# Patient Record
Sex: FEMALE | Race: WHITE | Employment: OTHER | URBAN - METROPOLITAN AREA
[De-identification: names, ages, dates, MRNs, and addresses within clinical notes are randomized per-mention and may not be internally consistent; named-entity substitution may affect disease eponyms.]

---

## 2017-11-15 ENCOUNTER — OFFICE VISIT (OUTPATIENT)
Dept: SURGERY | Age: 72
End: 2017-11-15

## 2017-11-15 ENCOUNTER — ANESTHESIA EVENT (OUTPATIENT)
Dept: SURGERY | Age: 72
End: 2017-11-15
Payer: MEDICARE

## 2017-11-15 VITALS
HEART RATE: 60 BPM | DIASTOLIC BLOOD PRESSURE: 70 MMHG | OXYGEN SATURATION: 97 % | RESPIRATION RATE: 16 BRPM | TEMPERATURE: 96.5 F | BODY MASS INDEX: 19.76 KG/M2 | SYSTOLIC BLOOD PRESSURE: 161 MMHG | WEIGHT: 118.6 LBS | HEIGHT: 65 IN

## 2017-11-15 DIAGNOSIS — K41.90 FEMORAL HERNIA OF LEFT SIDE: Primary | ICD-10-CM

## 2017-11-15 RX ORDER — ATORVASTATIN CALCIUM 40 MG/1
40 TABLET, FILM COATED ORAL
COMMUNITY

## 2017-11-15 RX ORDER — EZETIMIBE 10 MG/1
10 TABLET ORAL
COMMUNITY

## 2017-11-15 RX ORDER — IBUPROFEN 200 MG
200 TABLET ORAL
COMMUNITY
End: 2017-11-15

## 2017-11-15 RX ORDER — SOTALOL HYDROCHLORIDE 80 MG/1
TABLET ORAL
COMMUNITY

## 2017-11-15 RX ORDER — LOSARTAN POTASSIUM 25 MG/1
25 TABLET ORAL
COMMUNITY

## 2017-11-15 RX ORDER — ASPIRIN 81 MG/1
81 TABLET ORAL
COMMUNITY

## 2017-11-15 NOTE — LETTER
11/15/2017 10:02 AM 
 
Patient:  Saúl Angulo YOB: 1945 Date of Visit: 11/15/2017 MD [address] [facility] [address] [facility] [address] VIA Facsimile: [phone] Dear Sandy Rivera MD, Thank you for referring Ms. Luisa Romeo to [place] [address] for evaluation and treatment. Below are the relevant portions of my assessment and plan of care. Thank you very much for your referral of Ms. Cori Flores. If you have questions, please do not hesitate to call me. I look forward to following Ms. Romeo along with you and will keep you updated as to her progress. Sincerely, Josué Okeefe MD

## 2017-11-15 NOTE — PATIENT INSTRUCTIONS
If you have any questions or concerns about today's appointment, the verbal and/or written instructions you were given for follow up care, please call our office at [phone]. [facility] - [facility]  [id number] [place] [address] Road    [phone] office  [phone] fax    . Lois Bustos PATIENT PRE AND POST OPERATIVE INSTRUCTIONS     [facility] [address]  [phone]    Before Surgery Instructions:   1) You must have someone available to drive you to and from your procedure and stay with you for the first 24 hours. 2) It is very important that you have nothing to eat or drink after midnight the night before your surgery. This includes chewing gum or sucking on hard candy. Take only heart, blood pressure and cholesterol medications the morning of surgery with only a sip of water. 3) Please stop taking Plavix 10 days prior to your surgery. Stop taking Coumadin 5 days prior to your surgery. Stop taking all Aspirin or Aspirin containing products 7 days prior to your surgery. Stop taking Advil, Motrin, Aleve, and etc. 3 days prior to your surgery. 4) If you take any diabetic medications please consult with your primary care physician on how to take them on the day of your surgery  5) Please stop all Herbal products 2 weeks prior to your surgery. 6) Please arrive at the hospital 2 hours prior to your surgery, unless you have been otherwise instructed. 7) Patients having an operation on their colon will be given a separate instruction sheet on their Bowel Prep. 8) For any pre-operative work up check in at the main entrance to [facility], and then go to Patient Registration. These studies are done on a walk in basis they are open from 7:00am to 5:00pm Monday through Friday. 9) Please wash your surgical site the morning of your surgery with soap and water.   10) If you are of child bearing age you will have pregnancy test done the morning of your surgery as soon as you arrive. 11) You may be contacted to change your surgery time. At times this is necessary due to equipment or staffing needs. After Surgery Instructions: You will need to be seen in the office for a follow-up visit 7-14 days after your surgery. Please call after you have had the procedure to make this appointment. Unless otherwise instructed, you may remove your outer bandage and shower 48 hours after your surgery. If you develop a fever greater than 101, have any significant drainage, bleeding, swelling and/or pus of the wound. Please call our office immediately. Surgery Date and Time:  Thursday, November 16, 2017 at 11:00am     Please check in at [facility], enter through the Emergency Room entrance and go up to the second floor. Please check in by 9:00am the day of your surgery. You may contact Eric Cedeno with any questions at [phone].

## 2017-11-15 NOTE — PROGRESS NOTES
General Surgery Consult      Shanthi Goodman  Admit date: (Not on file)    MRN: [id number]     : 1945     Age: 67 y.o. Attending Physician: Jean Phoenix, MD, FACS      History of Present Illness:     Shanthi Goodman is a 67 y.o. female was referred for evaluation of right inguinal hernia. Symptoms were first noted a few months ago. Pain is dull, intermittent. The mass is reducible. The mass does come and goes depending If she is standing or sittingShe does not have a history of incarceration or obstruction. Patient does not have chronic constipation. Patient does not have a history of  previous hernia surgery. There are no active problems to display for this patient. No past medical history on file. No past surgical history on file. Social History   Substance Use Topics    Smoking status: Former Smoker    Smokeless tobacco: Never Used    Alcohol use Not on file      History   Smoking Status    Former Smoker   Smokeless Tobacco    Never Used     No family history on file. Current Outpatient Prescriptions   Medication Sig    aspirin delayed-release 81 mg tablet 81 mg.    atorvastatin (LIPITOR) 40 mg tablet 40 mg.    ezetimibe (ZETIA) 10 mg tablet 10 mg.    ibuprofen (MOTRIN) 200 mg tablet 200 mg.    losartan (COZAAR) 25 mg tablet 25 mg.    sotalol (BETAPACE) 80 mg tablet Take  by Mouth.  apixaban (ELIQUIS) 5 mg tablet Take 5 mg by mouth two (2) times a day.  apixaban (ELIQUIS) 2.5 mg tablet Take  by Mouth. No current facility-administered medications for this visit.        Allergies   Allergen Reactions    Sulfa (Sulfonamide Antibiotics) Rash        Review of Systems:  Constitutional: negative  Eyes: negative  Ears, Nose, Mouth, Throat, and Face: negative  Respiratory: negative  Cardiovascular: negative  Gastrointestinal: positive for abdominal pain  Genitourinary:negative  Integument/Breast: negative  Hematologic/Lymphatic: negative  Musculoskeletal:negative  Neurological: negative  Behavioral/Psychiatric: negative  Endocrine: negative  Allergic/Immunologic: negative    Objective:     Visit Vitals    /70 (BP 1 Location: Left arm, BP Patient Position: Sitting)    Pulse 60    Temp 96.5 °F (35.8 °C) (Oral)    Resp 16    Ht 5' 5\" (1.651 m)    Wt 53.8 kg (118 lb 9.6 oz)    SpO2 97%    BMI 19.74 kg/m2       Physical Exam:      General:  in no apparent distress, alert, oriented times 3 and afebrile   Eyes:  conjunctivae and sclerae normal, pupils equal, round, reactive to light   Throat & Neck: no erythema or exudates noted and neck supple and symmetrical; no palpable masses   Lungs:   clear to auscultation bilaterally   Heart:  Regular rate and rhythm   Abdomen:   flat, soft, nontender, nondistended, no masses or organomegaly. There is a small right femoral hernia that is mildly tender but reducible. Extremities: extremities normal, atraumatic, no cyanosis or edema   Skin: Normal.       Imaging and Lab Review:     CBC: No results found for: WBC, RBC, HGB, HCT, PLT, HGBEXT, HCTEXT, PLTEXT  BMP: No results found for: GLU, NA, K, CL, CO2, BUN, CREA, CA  CMP:No results found for: GLU, NA, K, CL, CO2, BUN, CREA, CA, AGAP, BUCR, TBIL, GPT, AP, TP, ALB, GLOB, AGRAT    No results found for this or any previous visit (from the past 24 hour(s)). images and reports reviewed    Assessment:   Sofi Killian is a 67 y.o. female is presenting with a picture of last femoral hernia. I Discussed the possibility of incarceration, strangulation, enlargement in size over time, and the risk of emergency surgery in the face of strangulation. I also discussed the use of prosthetic materials (mesh), including the risk of infection.  Also discussed the risk of surgery including recurrence and the possible need for reoperation and removal of mesh if used, possibility of postoperative small bowel injury, obstruction or ileus, and the risks of general anesthetic. I explained to the the patient about the robotic hernia repair procedure. Plan:     1. Schedule for robotic left femoral hernia repair with placement of mesh. 2. No heavy lifting for 2 weeks after the surgery (More than 15 pounds)  3. Avoid constipation by taking stool softener.      Please call me if you have any questions (cell phone: [phone])     Signed By: Susan Kwok MD     November 15, 2017

## 2017-11-15 NOTE — MR AVS SNAPSHOT
Visit Information Date & Time Provider Department Dept. Phone Encounter #  
 11/15/2017 10:00 AM Jaswinder Harrison [address] [facility] [phone] [id number] Your Appointments 12/4/2017 10:00 AM  
POST OP with Gavin Rapp MD  
[address] [place] [address] [facility]) Appt Note: Post Op [facility] [address]  
  
   
 [facility] [address] [facility] 95 Upcoming Health Maintenance Date Due DTaP/Tdap/Td series (1 - Tdap) 5/19/1965 BREAST CANCER SCRN MAMMOGRAM 5/19/1994 FOBT Q 1 YEAR AGE 50-75 5/19/1994 ZOSTER VACCINE AGE 60> 3/19/2004 GLAUCOMA SCREENING Q2Y 5/19/2009 Pneumococcal 65+ Low/Medium Risk (1 of 2 - PCV13) 5/19/2009 MEDICARE YEARLY EXAM 5/19/2009 Influenza Age 5 to Adult 8/1/2017 Allergies as of 11/15/2017  Review Complete On: 11/15/2017 By: Robby Robles Severity Noted Reaction Type Reactions Sulfa (Sulfonamide Antibiotics)  11/15/2017    Rash Current Immunizations  Never Reviewed No immunizations on file. Not reviewed this visit Vitals BP Pulse Temp Resp Height(growth percentile) Weight(growth percentile) 161/70 (BP 1 Location: Left arm, BP Patient Position: Sitting) 60 96.5 °F (35.8 °C) (Oral) 16 5' 5\" (1.651 m) 118 lb 9.6 oz (53.8 kg) SpO2 BMI OB Status Smoking Status 97% 19.74 kg/m2 Menopause Former Smoker BMI and BSA Data Body Mass Index Body Surface Area 19.74 kg/m 2 1.57 m 2 Your Updated Medication List  
  
   
This list is accurate as of: 11/15/17 10:27 AM.  Always use your most recent med list.  
  
  
  
  
 aspirin delayed-release 81 mg tablet 81 mg.  
  
 atorvastatin 40 mg tablet Commonly known as:  LIPITOR 40 mg.  
  
 * ELIQUIS 2.5 mg tablet Generic drug:  apixaban Take  by Mouth. * ELIQUIS 5 mg tablet Generic drug:  apixaban Take 5 mg by mouth two (2) times a day.  
  
 ezetimibe 10 mg tablet Commonly known as:  Zelpha [place] 10 mg.  
  
 ibuprofen 200 mg tablet Commonly known as:  MOTRIN  
200 mg.  
  
 losartan 25 mg tablet Commonly known as:  COZAAR  
25 mg.  
  
 sotalol 80 mg tablet Commonly known as:  Schaffer Minda Take  by Mouth. * Notice: This list has 2 medication(s) that are the same as other medications prescribed for you. Read the directions carefully, and ask your doctor or other care provider to review them with you. Patient Instructions If you have any questions or concerns about today's appointment, the verbal and/or written instructions you were given for follow up care, please call our office at [phone]. [facility] - [address], [address] 
 
[phone] office [phone] fax Dalton Sandoval PATIENT PRE AND POST OPERATIVE INSTRUCTIONS [address] 
[phone] Before Surgery Instructions:  
1) You must have someone available to drive you to and from your procedure and stay with you for the first 24 hours. 2) It is very important that you have nothing to eat or drink after midnight the night before your surgery. This includes chewing gum or sucking on hard candy. Take only heart, blood pressure and cholesterol medications the morning of surgery with only a sip of water. 3) Please stop taking Plavix 10 days prior to your surgery. Stop taking Coumadin 5 days prior to your surgery. Stop taking all Aspirin or Aspirin containing products 7 days prior to your surgery. Stop taking Advil, Motrin, Aleve, and etc. 3 days prior to your surgery. 4) If you take any diabetic medications please consult with your primary care physician on how to take them on the day of your surgery 5) Please stop all Herbal products 2 weeks prior to your surgery. 6) Please arrive at the hospital 2 hours prior to your surgery, unless you have been otherwise instructed. 7) Patients having an operation on their colon will be given a separate instruction sheet on their Bowel Prep. 8) For any pre-operative work up check in at the main entrance to [address] [place] [address], and then go to Patient Registration. These studies are done on a walk in basis they are open from 7:00am to 5:00pm Monday through Friday. 9) Please wash your surgical site the morning of your surgery with soap and water. 10) If you are of child bearing age you will have pregnancy test done the morning of your surgery as soon as you arrive. 11) You may be contacted to change your surgery time. At times this is necessary due to equipment or staffing needs. After Surgery Instructions: You will need to be seen in the office for a follow-up visit 7-14 days after your surgery. Please call after you have had the procedure to make this appointment. Unless otherwise instructed, you may remove your outer bandage and shower 48 hours after your surgery. If you develop a fever greater than 101, have any significant drainage, bleeding, swelling and/or pus of the wound. Please call our office immediately. Surgery Date and Time:  Thursday, November 16, 2017 at 11:00am  
 
Please check in at [facility], enter through the Emergency Room entrance and go up to the second floor. Please check in by 9:00am the day of your surgery. You may contact Karma Salomon with any questions at [phone]. Introducing [facility] HEALTH [facility]! Corbin Fitzpatrick introduces [employer] patient portal. Now you can access parts of your medical record, email your doctor's office, and request medication refills online. 1. In your internet browser, go to [email or web address] 2. Click on the First Time User? Click Here link in the Sign In box. You will see the New Member Sign Up page. 3. Enter your [employer] Access Code exactly as it appears below. You will not need to use this code after youve completed the sign-up process. If you do not sign up before the expiration date, you must request a new code. · [employer] Access Code: [email or web address] Expires: 2/13/2018  9:45 AM 
 
4. Enter the last four digits of your Social Security Number (xxxx) and Date of Birth (mm/dd/yyyy) as indicated and click Submit. You will be taken to the next sign-up page. 5. Create a [employer] ID. This will be your [employer] login ID and cannot be changed, so think of one that is secure and easy to remember. 6. Create a [employer] password. You can change your password at any time. 7. Enter your Password Reset Question and Answer. This can be used at a later time if you forget your password. 8. Enter your e-mail address. You will receive e-mail notification when new information is available in [address]. 9. Click Sign Up. You can now view and download portions of your medical record. 10. Click the Download Summary menu link to download a portable copy of your medical information. If you have questions, please visit the Frequently Asked Questions section of the [employer] website. Remember, [employer] is NOT to be used for urgent needs. For medical emergencies, dial 911. Now available from your iPhone and Android! Please provide this summary of care documentation to your next provider. Your primary care clinician is listed as Thee Jin. If you have any questions after today's visit, please call [phone].

## 2017-11-16 ENCOUNTER — ANESTHESIA (OUTPATIENT)
Dept: SURGERY | Age: 72
End: 2017-11-16
Payer: MEDICARE

## 2017-11-16 ENCOUNTER — HOSPITAL ENCOUNTER (OUTPATIENT)
Age: 72
Setting detail: OUTPATIENT SURGERY
Discharge: HOME | End: 2017-11-16
Attending: SURGERY | Admitting: SURGERY
Payer: MEDICARE

## 2017-11-16 VITALS
HEIGHT: 65 IN | WEIGHT: 117.3 LBS | DIASTOLIC BLOOD PRESSURE: 69 MMHG | TEMPERATURE: 97.4 F | SYSTOLIC BLOOD PRESSURE: 158 MMHG | RESPIRATION RATE: 18 BRPM | OXYGEN SATURATION: 100 % | BODY MASS INDEX: 19.54 KG/M2 | HEART RATE: 51 BPM

## 2017-11-16 LAB
BUN BLD-MCNC: 13 MG/DL (ref 7–18)
CHLORIDE BLD-SCNC: 100 MMOL/L (ref 100–108)
GLUCOSE BLD STRIP.AUTO-MCNC: 96 MG/DL (ref 74–106)
HCT VFR BLD CALC: 44 % (ref 36–49)
HGB BLD-MCNC: 15 G/DL (ref 12–16)
POTASSIUM BLD-SCNC: 3.9 MMOL/L (ref 3.5–5.5)
SODIUM BLD-SCNC: 140 MMOL/L (ref 136–145)

## 2017-11-16 PROCEDURE — 77030031139 HC SUT VCRL2 J&J -A: Performed by: SURGERY

## 2017-11-16 PROCEDURE — 74011250637 HC RX REV CODE- 250/637: Performed by: NURSE ANESTHETIST, CERTIFIED REGISTERED

## 2017-11-16 PROCEDURE — 77030032490 HC SLV COMPR SCD KNE COVD -B: Performed by: SURGERY

## 2017-11-16 PROCEDURE — 74011000250 HC RX REV CODE- 250

## 2017-11-16 PROCEDURE — 82947 ASSAY GLUCOSE BLOOD QUANT: CPT

## 2017-11-16 PROCEDURE — 74011250636 HC RX REV CODE- 250/636

## 2017-11-16 PROCEDURE — 77030002933 HC SUT MCRYL J&J -A: Performed by: SURGERY

## 2017-11-16 PROCEDURE — 77030013079 HC BLNKT BAIR HGGR 3M -A: Performed by: NURSE ANESTHETIST, CERTIFIED REGISTERED

## 2017-11-16 PROCEDURE — 93005 ELECTROCARDIOGRAM TRACING: CPT

## 2017-11-16 PROCEDURE — 77030008477 HC STYL SATN SLP COVD -A: Performed by: NURSE ANESTHETIST, CERTIFIED REGISTERED

## 2017-11-16 PROCEDURE — 77030020703 HC SEAL CANN DISP INTU -B: Performed by: SURGERY

## 2017-11-16 PROCEDURE — 74011250636 HC RX REV CODE- 250/636: Performed by: SURGERY

## 2017-11-16 PROCEDURE — 77030022704 HC SUT VLOC COVD -B: Performed by: SURGERY

## 2017-11-16 PROCEDURE — 76210000026 HC REC RM PH II 1 TO 1.5 HR: Performed by: SURGERY

## 2017-11-16 PROCEDURE — 77030011640 HC PAD GRND REM COVD -A: Performed by: SURGERY

## 2017-11-16 PROCEDURE — 76210000016 HC OR PH I REC 1 TO 1.5 HR: Performed by: SURGERY

## 2017-11-16 PROCEDURE — 76060000033 HC ANESTHESIA 1 TO 1.5 HR: Performed by: SURGERY

## 2017-11-16 PROCEDURE — 74011250636 HC RX REV CODE- 250/636: Performed by: NURSE ANESTHETIST, CERTIFIED REGISTERED

## 2017-11-16 PROCEDURE — 74011000250 HC RX REV CODE- 250: Performed by: SURGERY

## 2017-11-16 PROCEDURE — 77030008683 HC TU ET CUF COVD -A: Performed by: NURSE ANESTHETIST, CERTIFIED REGISTERED

## 2017-11-16 PROCEDURE — 77030010507 HC ADH SKN DERMBND J&J -B: Performed by: SURGERY

## 2017-11-16 PROCEDURE — 76010000934 HC OR TIME 1 TO 1.5HR INTENSV - TIER 2: Performed by: SURGERY

## 2017-11-16 PROCEDURE — 77030018842 HC SOL IRR SOD CL 9% BAXT -A: Performed by: SURGERY

## 2017-11-16 PROCEDURE — C1781 MESH (IMPLANTABLE): HCPCS | Performed by: SURGERY

## 2017-11-16 PROCEDURE — 77030035277 HC OBTRTR BLDELSS DISP INTU -B: Performed by: SURGERY

## 2017-11-16 DEVICE — LAPAROSCOPIC SELF-FIXATING MESH POLYESTER WITH POLYLACTIC ACID GRIPS AND COLLAGEN FILM
Type: IMPLANTABLE DEVICE | Site: INGUINAL | Status: FUNCTIONAL
Brand: PROGRIP

## 2017-11-16 RX ORDER — GLYCOPYRROLATE 0.2 MG/ML
INJECTION INTRAMUSCULAR; INTRAVENOUS AS NEEDED
Status: DISCONTINUED | OUTPATIENT
Start: 2017-11-16 | End: 2017-11-16 | Stop reason: HOSPADM

## 2017-11-16 RX ORDER — NEOSTIGMINE METHYLSULFATE 5 MG/5 ML
SYRINGE (ML) INTRAVENOUS AS NEEDED
Status: DISCONTINUED | OUTPATIENT
Start: 2017-11-16 | End: 2017-11-16 | Stop reason: HOSPADM

## 2017-11-16 RX ORDER — LIDOCAINE HYDROCHLORIDE 20 MG/ML
INJECTION, SOLUTION EPIDURAL; INFILTRATION; INTRACAUDAL; PERINEURAL AS NEEDED
Status: DISCONTINUED | OUTPATIENT
Start: 2017-11-16 | End: 2017-11-16 | Stop reason: HOSPADM

## 2017-11-16 RX ORDER — NALOXONE HYDROCHLORIDE 0.4 MG/ML
0.04 INJECTION, SOLUTION INTRAMUSCULAR; INTRAVENOUS; SUBCUTANEOUS
Status: DISCONTINUED | OUTPATIENT
Start: 2017-11-16 | End: 2017-11-16 | Stop reason: HOSPADM

## 2017-11-16 RX ORDER — BUPIVACAINE HYDROCHLORIDE AND EPINEPHRINE 5; 5 MG/ML; UG/ML
INJECTION, SOLUTION EPIDURAL; INTRACAUDAL; PERINEURAL AS NEEDED
Status: DISCONTINUED | OUTPATIENT
Start: 2017-11-16 | End: 2017-11-16 | Stop reason: HOSPADM

## 2017-11-16 RX ORDER — HYDROMORPHONE HYDROCHLORIDE 2 MG/ML
0.2 INJECTION, SOLUTION INTRAMUSCULAR; INTRAVENOUS; SUBCUTANEOUS AS NEEDED
Status: DISCONTINUED | OUTPATIENT
Start: 2017-11-16 | End: 2017-11-16 | Stop reason: HOSPADM

## 2017-11-16 RX ORDER — FLUMAZENIL 0.1 MG/ML
0.2 INJECTION INTRAVENOUS
Status: DISCONTINUED | OUTPATIENT
Start: 2017-11-16 | End: 2017-11-16 | Stop reason: HOSPADM

## 2017-11-16 RX ORDER — ONDANSETRON 2 MG/ML
INJECTION INTRAMUSCULAR; INTRAVENOUS AS NEEDED
Status: DISCONTINUED | OUTPATIENT
Start: 2017-11-16 | End: 2017-11-16 | Stop reason: HOSPADM

## 2017-11-16 RX ORDER — FENTANYL CITRATE 50 UG/ML
25 INJECTION, SOLUTION INTRAMUSCULAR; INTRAVENOUS AS NEEDED
Status: DISCONTINUED | OUTPATIENT
Start: 2017-11-16 | End: 2017-11-16 | Stop reason: HOSPADM

## 2017-11-16 RX ORDER — MIDAZOLAM HYDROCHLORIDE 1 MG/ML
INJECTION, SOLUTION INTRAMUSCULAR; INTRAVENOUS AS NEEDED
Status: DISCONTINUED | OUTPATIENT
Start: 2017-11-16 | End: 2017-11-16 | Stop reason: HOSPADM

## 2017-11-16 RX ORDER — SUCCINYLCHOLINE CHLORIDE 20 MG/ML
INJECTION INTRAMUSCULAR; INTRAVENOUS AS NEEDED
Status: DISCONTINUED | OUTPATIENT
Start: 2017-11-16 | End: 2017-11-16 | Stop reason: HOSPADM

## 2017-11-16 RX ORDER — FAMOTIDINE 20 MG/1
20 TABLET, FILM COATED ORAL
Status: COMPLETED | OUTPATIENT
Start: 2017-11-16 | End: 2017-11-16

## 2017-11-16 RX ORDER — VECURONIUM BROMIDE FOR INJECTION 1 MG/ML
INJECTION, POWDER, LYOPHILIZED, FOR SOLUTION INTRAVENOUS AS NEEDED
Status: DISCONTINUED | OUTPATIENT
Start: 2017-11-16 | End: 2017-11-16 | Stop reason: HOSPADM

## 2017-11-16 RX ORDER — PROPOFOL 10 MG/ML
INJECTION, EMULSION INTRAVENOUS AS NEEDED
Status: DISCONTINUED | OUTPATIENT
Start: 2017-11-16 | End: 2017-11-16 | Stop reason: HOSPADM

## 2017-11-16 RX ORDER — OXYCODONE AND ACETAMINOPHEN 5; 325 MG/1; MG/1
1 TABLET ORAL
Qty: 24 TAB | Refills: 0 | Status: SHIPPED | OUTPATIENT
Start: 2017-11-16

## 2017-11-16 RX ORDER — SODIUM CHLORIDE, SODIUM LACTATE, POTASSIUM CHLORIDE, CALCIUM CHLORIDE 600; 310; 30; 20 MG/100ML; MG/100ML; MG/100ML; MG/100ML
100 INJECTION, SOLUTION INTRAVENOUS CONTINUOUS
Status: DISCONTINUED | OUTPATIENT
Start: 2017-11-16 | End: 2017-11-16 | Stop reason: HOSPADM

## 2017-11-16 RX ORDER — FENTANYL CITRATE 50 UG/ML
INJECTION, SOLUTION INTRAMUSCULAR; INTRAVENOUS AS NEEDED
Status: DISCONTINUED | OUTPATIENT
Start: 2017-11-16 | End: 2017-11-16 | Stop reason: HOSPADM

## 2017-11-16 RX ORDER — CEFAZOLIN SODIUM 2 G/50ML
2 SOLUTION INTRAVENOUS
Status: COMPLETED | OUTPATIENT
Start: 2017-11-16 | End: 2017-11-16

## 2017-11-16 RX ORDER — SODIUM CHLORIDE, SODIUM LACTATE, POTASSIUM CHLORIDE, CALCIUM CHLORIDE 600; 310; 30; 20 MG/100ML; MG/100ML; MG/100ML; MG/100ML
75 INJECTION, SOLUTION INTRAVENOUS CONTINUOUS
Status: DISCONTINUED | OUTPATIENT
Start: 2017-11-17 | End: 2017-11-16 | Stop reason: HOSPADM

## 2017-11-16 RX ADMIN — PROPOFOL 110 MG: 10 INJECTION, EMULSION INTRAVENOUS at 10:34

## 2017-11-16 RX ADMIN — SUCCINYLCHOLINE CHLORIDE 60 MG: 20 INJECTION INTRAMUSCULAR; INTRAVENOUS at 10:35

## 2017-11-16 RX ADMIN — Medication 3 MG: at 11:12

## 2017-11-16 RX ADMIN — GLYCOPYRROLATE 0.4 MG: 0.2 INJECTION INTRAMUSCULAR; INTRAVENOUS at 11:12

## 2017-11-16 RX ADMIN — VECURONIUM BROMIDE FOR INJECTION 1 MG: 1 INJECTION, POWDER, LYOPHILIZED, FOR SOLUTION INTRAVENOUS at 10:34

## 2017-11-16 RX ADMIN — SODIUM CHLORIDE, SODIUM LACTATE, POTASSIUM CHLORIDE, AND CALCIUM CHLORIDE 100 ML/HR: 600; 310; 30; 20 INJECTION, SOLUTION INTRAVENOUS at 12:30

## 2017-11-16 RX ADMIN — MIDAZOLAM HYDROCHLORIDE 2 MG: 1 INJECTION, SOLUTION INTRAMUSCULAR; INTRAVENOUS at 10:24

## 2017-11-16 RX ADMIN — ONDANSETRON 4 MG: 2 INJECTION INTRAMUSCULAR; INTRAVENOUS at 10:34

## 2017-11-16 RX ADMIN — FENTANYL CITRATE 50 MCG: 50 INJECTION, SOLUTION INTRAMUSCULAR; INTRAVENOUS at 10:24

## 2017-11-16 RX ADMIN — CEFAZOLIN SODIUM 2 G: 2 SOLUTION INTRAVENOUS at 10:39

## 2017-11-16 RX ADMIN — LIDOCAINE HYDROCHLORIDE 60 MG: 20 INJECTION, SOLUTION EPIDURAL; INFILTRATION; INTRACAUDAL; PERINEURAL at 10:34

## 2017-11-16 RX ADMIN — FAMOTIDINE 20 MG: 20 TABLET, FILM COATED ORAL at 09:41

## 2017-11-16 RX ADMIN — VECURONIUM BROMIDE FOR INJECTION 3 MG: 1 INJECTION, POWDER, LYOPHILIZED, FOR SOLUTION INTRAVENOUS at 10:41

## 2017-11-16 RX ADMIN — SODIUM CHLORIDE, SODIUM LACTATE, POTASSIUM CHLORIDE, AND CALCIUM CHLORIDE 75 ML/HR: 600; 310; 30; 20 INJECTION, SOLUTION INTRAVENOUS at 09:42

## 2017-11-16 NOTE — PERIOP NOTES
1126  Patient received in PACU and connected to monitors. Vital signs stable. RN at bedside. Will continue to monitor. 1152  Attempted to speak to pt's  in waiting area, staff states, \"He stepped out for a minute. \"  Pt resting with eyes closed, no distress noted. [address] [facility], RN will call back when available.

## 2017-11-16 NOTE — PROGRESS NOTES
Date of Surgery Update:  Jami Montenegro was seen and examined. History and physical has been reviewed. The patient has been examined. There have been no significant clinical changes since the completion of the originally dated History and Physical. Will proceed with robotic right femoral (Inguinal) hernia repair with mesh.      Signed By: Lori Jerome MD     November 16, 2017 9:55 AM

## 2017-11-16 NOTE — BRIEF OP NOTE
BRIEF OPERATIVE NOTE    Date of Procedure: 11/16/2017   Preoperative Diagnosis: femoral hernia  k41.90  Postoperative Diagnosis: femoral hernia  k41.90    Procedure(s):  davinci repair of right inguinal hernia w/mesh ROBOTIC ASSISTED  Surgeon(s) and Role:     * Mayco Hernandez MD - Primary         Assistant Staff:       Surgical Staff:  Circ-1: Sanjeev Canela RN  Scrub Tech-1: [facility]  Surg Asst-1: Duncan PONCE [place]  Event Time In   Incision Start 1042   Incision Close      Anesthesia: General   Estimated Blood Loss: Minimal  Specimens: * No specimens in log *   Findings: Direct right inguinal hernias (2)   Complications: none  Implants:   Implant Name Type Inv.  Item Serial No.  Lot No. LRB No. Used Action   MESH ABSORB SURG PROGRIP 10X15 --  - [id number]   MESH ABSORB SURG PROGRIP 10X15 --    COVIDIEN  SURGICAL [id number] Right 1 Implanted

## 2017-11-16 NOTE — ANESTHESIA PREPROCEDURE EVALUATION
Anesthetic History   No history of anesthetic complications            Review of Systems / Medical History  Patient summary reviewed and pertinent labs reviewed    Pulmonary  Within defined limits                 Neuro/Psych   Within defined limits           Cardiovascular    Hypertension: well controlled        Dysrhythmias : atrial fibrillation      Exercise tolerance: >4 METS     GI/Hepatic/Renal  Within defined limits              Endo/Other  Within defined limits           Other Findings   Comments:   Risk Factors for Postoperative nausea/vomiting:       History of postoperative nausea/vomiting? NO       Female? YES       Motion sickness? NO       Intended opioid administration for postoperative analgesia? YES      Smoking Abstinence  Current Smoker? NO  Elective Surgery? YES  Seen preoperatively by anesthesiologist or proxy prior to day of surgery? YES  Pt abstained from smoking 24 hours prior to anesthesia?  N/A           Physical Exam    Airway  Mallampati: II  TM Distance: 4 - 6 cm  Neck ROM: decreased range of motion   Mouth opening: Normal     Cardiovascular    Rhythm: regular  Rate: normal         Dental    Dentition: Poor dentition     Pulmonary  Breath sounds clear to auscultation               Abdominal  GI exam deferred       Other Findings            Anesthetic Plan    ASA: 3  Anesthesia type: general          Induction: Intravenous  Anesthetic plan and risks discussed with: Patient

## 2017-11-16 NOTE — DISCHARGE INSTRUCTIONS
Instructions Following Ambulatory Surgery    Patient: Jarrod Richard MRN: [id number]  SSN: [id number]    YOB: 1945  Age: 67 y.o. Sex: female      Activity  · As tolerated, walking encourage, stairs are okay  · Avoid strenuous activities - no lifting anything heavier than 15 pounds. · You may shower at home after 48 hours. Diet  · Regular diet after nausea from the anesthetic has passed. Pain  · Take pain medication as directed by your doctor  ·  Call your doctor if pain is NOT relieved by medication    Dressing Care  · Remove outer dressing (if any) after 48 hours. Leave steri-strips (if any) in place until they fall off. After Anesthesia  · For the first 24 hours: DO NOT Drive, Drink alcoholic beverages, or Make important decisions  · Be aware of dizziness following anesthesia and while taking pain medication    Call your doctor if  · Excessive bleeding that does not stop after holding mild pressure over the area  · Temperature of 101 degrees F or above  · Redness,excessive swelling or bruising, and/or green or yellow, smelly discharge from incision  · If nausea and vomiting continues    Follow-Up Phone Calls  · Call the office at [phone] to make your follow-up appointment    Appointment date/time: 2 weeks after the surgery. Dr. Vee Marcus cell phone number is [phone]. Please call me if you have any concerns or questions. Patient armband removed and given to patient to take home.   Patient was informed of the privacy risks if armband lost or stolen

## 2017-11-16 NOTE — ANESTHESIA POSTPROCEDURE EVALUATION
Post-Anesthesia Evaluation and Assessment    Patient: Satnam Corbin MRN: [id number]  SSN: [id number]    YOB: 1945  Age: 67 y.o. Sex: female      Data from PACU flowsheet    Cardiovascular Function/Vital Signs  Visit Vitals    BP (P) 168/61 (BP 1 Location: Left arm, BP Patient Position: At rest;Supine)    Pulse (!) 45    Temp 36.3 °C (97.4 °F)    Resp 18    Ht 5' 5\" (1.651 m)    Wt 53.2 kg (117 lb 4.8 oz)    SpO2 97%    BMI 19.52 kg/m2       Patient is status post general anesthesia for Procedure(s):  davinci repair of right inguinall hernia w/mesh ROBOTIC ASSISTED. Nausea/Vomiting: controlled    Postoperative hydration reviewed and adequate. Pain:  Pain Scale 1: (P) Visual (11/16/17 1205)  Pain Intensity 1: (P) 0 (11/16/17 1205)   Managed      Mental Status and Level of Consciousness: Alert and oriented     Pulmonary Status:   O2 Device: (P) Room air (11/16/17 1205)   Adequate oxygenation and airway patent    Complications related to anesthesia: None    Post-anesthesia assessment completed.  No concerns    Signed By: Eusebio Salinas MD     November 16, 2017

## 2017-11-16 NOTE — OP NOTES
Ezra Barnes    Name:  Lamont King  MR#:  [id number]  :  1945  Account #:  [de-identified]  Date of Adm:  2017  Date of Surgery:  2017      SURGEON: Alirio Quinonez. Katty Parkinson MD    PREOPERATIVE DIAGNOSIS: Right femoral hernia. POSTOPERATIVE DIAGNOSIS: Right direct inguinal hernia x2. PROCEDURES PERFORMED: Robotic repair of right inguinal hernia  with placement of mesh. ANESTHESIA: General.    COMPLICATIONS: None. SPECIMENS REMOVED: None. ESTIMATED BLOOD LOSS: Minimal.    DESCRIPTION OF PROCEDURE: The patient was brought to the  operating room, anesthesia was induced. Scrubbing and draping of the  abdomen was done in the usual manner. A timeout was performed. A  skin incision in the supraumbilical area was performed. Veress needle  was inserted. Abdomen was insufflated. An 8 mm port was inserted,  abdomen was explored. Two other 8 mm ports were placed on the  right and left side of the abdominal wall. There was a small omental  adhesion between the omentum and the abdominal wall in the lower  pelvis. This was taken down using Metzenbaums and then at this  point, there was what seems to be a direct inguinal hernia with  preperitoneal fat inside of it with the sac. At this point, the peritoneum  was opened. The preperitoneal space was dissected. The hernia sac  was reduced and I was surprised to identify 2 direct small hernias. At  this point, the round ligament was identified. It was divided and the  Adam ligament was identified and at this point, a 15 x 10 ProGrip  Covidien mesh was opened. It was cut to be about 12 x 10 cm. It was  placed in the preperitoneal space and it was fixed with interrupted 2-0  Vicryl sutures and then the peritoneum was closed on top of the mesh  with a running 2-0 V-Loc suture.  Hemostasis was secured, the  instruments were removed, the robot was undocked, and the skin  incisions were closed with 4-0 Dennys.         MD Fritz Hidalgo  D:  11/16/2017   11:11  T:  11/16/2017   16:09  Job #:  [id number]

## 2017-11-16 NOTE — PERIOP NOTES
pts , Russell Dunlap, will be back to  patient. Per pt- he is out rowing but planned to be back by 1230. Left a message on his cell phone. [phone].

## 2017-11-17 LAB
ATRIAL RATE: 54 BPM
CALCULATED P AXIS, ECG09: 57 DEGREES
CALCULATED R AXIS, ECG10: 20 DEGREES
CALCULATED T AXIS, ECG11: 35 DEGREES
DIAGNOSIS, 93000: NORMAL
P-R INTERVAL, ECG05: 164 MS
Q-T INTERVAL, ECG07: 436 MS
QRS DURATION, ECG06: 84 MS
QTC CALCULATION (BEZET), ECG08: 413 MS
VENTRICULAR RATE, ECG03: 54 BPM

## 2017-12-06 ENCOUNTER — OFFICE VISIT (OUTPATIENT)
Dept: SURGERY | Age: 72
End: 2017-12-06

## 2017-12-06 VITALS
DIASTOLIC BLOOD PRESSURE: 62 MMHG | BODY MASS INDEX: 19.63 KG/M2 | WEIGHT: 117.8 LBS | SYSTOLIC BLOOD PRESSURE: 138 MMHG | HEIGHT: 65 IN | OXYGEN SATURATION: 96 % | TEMPERATURE: 97.2 F | HEART RATE: 50 BPM | RESPIRATION RATE: 16 BRPM

## 2017-12-06 DIAGNOSIS — Z09 POSTOPERATIVE EXAMINATION: Primary | ICD-10-CM

## 2017-12-06 NOTE — PROGRESS NOTES
Rehan Colvin is a 67 y.o. female who presents today for a post-op exam for a robotic repair of RIH with placement of mesh performed on 11/16/17. Patient scores their post-op pain level on a pain scale from 1-10 as a 0 today. 1. Have you been to the ER, urgent care clinic since your last visit? Hospitalized since your last visit? Yes, [facility] on 12[facility]17 for a URI. 2. Have you seen or consulted any other health care providers outside of the [address] [place] [address] since your last visit? Include any pap smears or colon screening. Yes, see previous answer.

## 2017-12-06 NOTE — PATIENT INSTRUCTIONS
If you have any questions or concerns about today's appointment, the verbal and/or written instructions you were given for follow up care, please call our office at [phone].     [facility] - [address]    [phone] office  [phone]

## 2017-12-06 NOTE — MR AVS SNAPSHOT
Visit Information Date & Time Provider Department Dept. Phone Encounter #  
 12/6/2017 10:30 AM MD [facility] [phone] [id number] Upcoming Health Maintenance Date Due DTaP/Tdap/Td series (1 - Tdap) 5/19/1965 BREAST CANCER SCRN MAMMOGRAM 5/19/1994 FOBT Q 1 YEAR AGE 50-75 5/19/1994 ZOSTER VACCINE AGE 60> 3/19/2004 GLAUCOMA SCREENING Q2Y 5/19/2009 Pneumococcal 65+ Low/Medium Risk (1 of 2 - PCV13) 5/19/2009 MEDICARE YEARLY EXAM 5/19/2009 Influenza Age 5 to Adult 8/1/2017 Allergies as of 12/6/2017  Review Complete On: 12/6/2017 By: Rita Dewitt Severity Noted Reaction Type Reactions Sulfa (Sulfonamide Antibiotics)  11/15/2017    Rash Current Immunizations  Never Reviewed No immunizations on file. Not reviewed this visit Vitals BP Pulse Temp Resp Height(growth percentile) Weight(growth percentile)  
 138/62 (BP 1 Location: Right arm, BP Patient Position: Sitting) (!) 50 97.2 °F (36.2 °C) (Oral) 16 5' 5\" (1.651 m) 117 lb 12.8 oz (53.4 kg) SpO2 BMI OB Status Smoking Status 96% 19.6 kg/m2 Postmenopausal Former Smoker BMI and BSA Data Body Mass Index Body Surface Area 19.6 kg/m 2 1.56 m 2 Your Updated Medication List  
  
   
This list is accurate as of: 12/6/17 10:50 AM.  Always use your most recent med list.  
  
  
  
  
 aspirin delayed-release 81 mg tablet 81 mg.  
  
 atorvastatin 40 mg tablet Commonly known as:  LIPITOR Take 40 mg by mouth nightly. ELIQUIS 5 mg tablet Generic drug:  apixaban Take 5 mg by mouth two (2) times a day.  
  
 ezetimibe 10 mg tablet Commonly known as:  Vaughn Sellar Take 10 mg by mouth nightly. losartan 25 mg tablet Commonly known as:  COZAAR  
25 mg.  
  
 oxyCODONE-acetaminophen 5-325 mg per tablet Commonly known as:  PERCOCET  
 Take 1 Tab by mouth every four (4) hours as needed for Pain. Max Daily Amount: 6 Tabs.  
  
 sotalol 80 mg tablet Commonly known as:  Corinne Valencia Take  by Mouth. Patient Instructions If you have any questions or concerns about today's appointment, the verbal and/or written instructions you were given for follow up care, please call our office at [phone]. [facility] - [address], [place] [phone] office [phone] Introducing [facility]! Jie Benedict introduces BabyList patient portal. Now you can access parts of your medical record, email your doctor's office, and request medication refills online. 1. In your internet browser, go to [email or web address] 2. Click on the First Time User? Click Here link in the Sign In box. You will see the New Member Sign Up page. 3. Enter your BabyList Access Code exactly as it appears below. You will not need to use this code after youve completed the sign-up process. If you do not sign up before the expiration date, you must request a new code. · BabyList Access Code: [email or web address] Expires: 2/13/2018  9:45 AM 
 
4. Enter the last four digits of your Social Security Number (xxxx) and Date of Birth (mm/dd/yyyy) as indicated and click Submit. You will be taken to the next sign-up page. 5. Create a HealthStreamt ID. This will be your BabyList login ID and cannot be changed, so think of one that is secure and easy to remember. 6. Create a BabyList password. You can change your password at any time. 7. Enter your Password Reset Question and Answer. This can be used at a later time if you forget your password. 8. Enter your e-mail address. You will receive e-mail notification when new information is available in [address] 9. Click Sign Up. You can now view and download portions of your medical record. 10. Click the Download Summary menu link to download a portable copy of your medical information. If you have questions, please visit the Frequently Asked Questions section of the [employer] website. Remember, [employer] is NOT to be used for urgent needs. For medical emergencies, dial 911. Now available from your iPhone and Android! Please provide this summary of care documentation to your next provider. Your primary care clinician is listed as Thee Jin. If you have any questions after today's visit, please call [phone].

## 2017-12-06 NOTE — PROGRESS NOTES
Patient seen and examined. She's doing well. Tolerating regular diet. Abdomen is soft and non tender. Wounds are healing well.   Follow up as needed

## 2017-12-06 NOTE — LETTER
12/6/2017 10:34 AM 
 
Patient:  Romie Lindsey YOB: 1945 Date of Visit: 12/6/2017 MD [address]iffanie Quintero [facility] [address] [facility] [address] VIA Facsimile: [phone] Dear Jessica Bruno MD, Thank you for referring Ms. Luisa Romeo to [place] [address] for evaluation and treatment. Below are the relevant portions of my assessment and plan of care. Thank you very much for your referral of Ms. Wilner Johnson. If you have questions, please do not hesitate to call me. I look forward to following Ms. Romeo along with you and will keep you updated as to her progress. Sincerely, Waqas Hernandez MD

## 2018-01-24 ENCOUNTER — OFFICE VISIT (OUTPATIENT)
Dept: SURGERY | Age: 73
End: 2018-01-24

## 2018-01-24 VITALS
DIASTOLIC BLOOD PRESSURE: 73 MMHG | HEART RATE: 62 BPM | SYSTOLIC BLOOD PRESSURE: 174 MMHG | RESPIRATION RATE: 16 BRPM | HEIGHT: 65 IN | OXYGEN SATURATION: 98 % | TEMPERATURE: 97.6 F | WEIGHT: 120.2 LBS | BODY MASS INDEX: 20.03 KG/M2

## 2018-01-24 DIAGNOSIS — Z09 POSTOPERATIVE EXAMINATION: Primary | ICD-10-CM

## 2018-01-24 DIAGNOSIS — R10.33 PERIUMBILICAL ABDOMINAL PAIN: ICD-10-CM

## 2018-01-24 NOTE — PROGRESS NOTES
Fredi Ojeda is a 67 y.o. female who presents today for a follow up post-op exam for a robotic repair of RIH with placement of mesh performed on 11/16/17. Patient scores their post-op pain level on a pain scale from 1-10 as a 0 today. 1. Have you been to the ER, urgent care clinic since your last visit? Hospitalized since your last visit? No    2. Have you seen or consulted any other health care providers outside of the [address] [place] [address] since your last visit? Include any pap smears or colon screening. No     Patient verbally agrees to permit the medical students working in  [id number] office to observe and participate in medical care during the appointment today, including, where appropriate, providing direct medical care to patient under the physicians direct supervision. Patient agrees that he/she have been given the opportunity to refuse to give such consent and may withdraw consent at any time during appointment.

## 2018-01-24 NOTE — PROGRESS NOTES
Patient seen and examined. She is here today because she feels bloated. She denies any abdominal pain. She denies any nausea or vomiting. She denies any change in bowel habits. Her abdomen is soft and non tender. Her right groin exam is normal. Her wounds are healing well. Plan:  I reassured the patient that I do not think her abdomen and bloating is related to the surgery, however the patient is still anxious.  We will get a CT scan of the abdomen and pelvis  follow up of the CT scan results

## 2018-01-24 NOTE — PATIENT INSTRUCTIONS
If you have any questions or concerns about today's appointment, the verbal and/or written instructions you were given for follow up care, please call our office at [phone].     [facility] - [address]    [phone] office  [phone]

## 2018-01-24 NOTE — LETTER
1/24/2018 1:14 PM 
 
Patient:  Fredi Ojeda YOB: 1945 Date of Visit: 1/24/2018 MD [address]row [address]o [address] 
 [facility] [address] [facility] [address] VIA Facsimile: [phone] Dear Umang Benedict MD, Thank you for referring Ms. Luisa Romeo to [place] [address] for evaluation and treatment. Below are the relevant portions of my assessment and plan of care. Thank you very much for your referral of Ms. Del Rubin. If you have questions, please do not hesitate to call me. I look forward to following Ms. Romeo along with you and will keep you updated as to her progress. Sincerely, Shanice Willis MD

## 2018-01-24 NOTE — MR AVS SNAPSHOT
[address] [address] [phone] Patient: Wendi Romeo MRN: [id number] :1945 Visit Information Date & Time Provider Department Dept. Phone Encounter #  
 2018  1:15 PM MD [facility] [phone] [id number] Upcoming Health Maintenance Date Due DTaP/Tdap/Td series (1 - Tdap) 1965 BREAST CANCER SCRN MAMMOGRAM 1994 FOBT Q 1 YEAR AGE 50-75 1994 ZOSTER VACCINE AGE 60> 3/19/2004 GLAUCOMA SCREENING Q2Y 2009 Pneumococcal 65+ Low/Medium Risk (1 of 2 - PCV13) 2009 MEDICARE YEARLY EXAM 2009 Influenza Age 5 to Adult 2017 Allergies as of 2018  Review Complete On: 2018 By: Alexia Prieto Severity Noted Reaction Type Reactions Sulfa (Sulfonamide Antibiotics)  11/15/2017    Rash Current Immunizations  Never Reviewed No immunizations on file. Not reviewed this visit You Were Diagnosed With   
  
 Codes Comments Postoperative examination    -  Primary ICD-10-CM: V34 ICD-9-CM: V67.00 Periumbilical abdominal pain     ICD-10-CM: R10.33 ICD-9-CM: 789.05 Vitals BP Pulse Temp Resp Height(growth percentile) Weight(growth percentile) 174/73 (BP 1 Location: Right arm, BP Patient Position: Sitting) 62 97.6 °F (36.4 °C) (Oral) 16 5' 5\" (1.651 m) 120 lb 3.2 oz (54.5 kg) SpO2 BMI OB Status Smoking Status 98% 20 kg/m2 Postmenopausal Former Smoker BMI and BSA Data Body Mass Index Body Surface Area  
 20 kg/m 2 1.58 m 2 Your Updated Medication List  
  
   
This list is accurate as of: 18  3:23 PM.  Always use your most recent med list.  
  
  
  
  
 aspirin delayed-release 81 mg tablet 81 mg.  
  
 atorvastatin 40 mg tablet Commonly known as:  LIPITOR Take 40 mg by mouth nightly. ELIQUIS 5 mg tablet Generic drug:  apixaban Take 5 mg by mouth two (2) times a day.  
  
 ezetimibe 10 mg tablet Commonly known as:  Particia Earthly Take 10 mg by mouth nightly. losartan 25 mg tablet Commonly known as:  COZAAR  
25 mg.  
  
 oxyCODONE-acetaminophen 5-325 mg per tablet Commonly known as:  PERCOCET Take 1 Tab by mouth every four (4) hours as needed for Pain. Max Daily Amount: 6 Tabs.  
  
 sotalol 80 mg tablet Commonly known as:  Bon[place] Take  by Mouth. To-Do List   
 01/25/2018 Imaging:  CT ABD PELV W WO CONT Patient Instructions If you have any questions or concerns about today's appointment, the verbal and/or written instructions you were given for follow up care, please call our office at [phone]. [facility] - [place] [phone] office [phone] Introducing [facility]! [facility] introduces [employer] patient portal. Now you can access parts of your medical record, email your doctor's office, and request medication refills online. 1. In your internet browser, go to [email or web address] 2. Click on the First Time User? Click Here link in the Sign In box. You will see the New Member Sign Up page. 3. Enter your Imagine[employer] Access Code exactly as it appears below. You will not need to use this code after youve completed the sign-up process. If you do not sign up before the expiration date, you must request a new code. · [employer] Access Code: [email or web address] Expires: 2/13/2018  9:45 AM 
 
4. Enter the last four digits of your Social Security Number (xxxx) and Date of Birth (mm/dd/yyyy) as indicated and click Submit. You will be taken to the next sign-up page. 5. Create a Eye Phonet ID. This will be your Eye Phonet login ID and cannot be changed, so think of one that is secure and easy to remember. 6. Create a [employer] password. You can change your password at any time. 7. Enter your Password Reset Question and Answer. This can be used at a later time if you forget your password. 8. Enter your e-mail address. You will receive e-mail notification when new information is available in [address] 9. Click Sign Up. You can now view and download portions of your medical record. 10. Click the Download Summary menu link to download a portable copy of your medical information. If you have questions, please visit the Frequently Asked Questions section of the [employer] website. Remember, [employer] is NOT to be used for urgent needs. For medical emergencies, dial 911. Now available from your iPhone and Android! Please provide this summary of care documentation to your next provider. Your primary care clinician is listed as Malou Gutierrez. If you have any questions after today's visit, please call [phone].

## 2021-09-21 NOTE — IP AVS SNAPSHOT
[address] [place] [address] [address] 
[phone] Patient: Jessica Chadwick MRN: [id number] :1945 About your hospitalization You were admitted on:  2017 You last received care in the:  [facility] PACU You were discharged on:  2017 Why you were hospitalized Your primary diagnosis was:  Not on File Things You Need To Do (next 8 weeks) Follow up with Dickson Ngo MD  
  
Phone:  [phone] Where:  [address] [facility], [address], [facility] [address] Monday Dec 04, 2017 POST OP with Tyra Oconnor MD at 10:00 AM  
Where: [address] ([facility]) Discharge Orders None A check june indicates which time of day the medication should be taken. My Medications TAKE these medications as instructed Instructions Each Dose to Equal  
 Morning Noon Evening Bedtime  
 aspirin delayed-release 81 mg tablet Your last dose was: Your next dose is:    
   
   
 81 mg.  
 81 mg  
    
   
   
   
  
 atorvastatin 40 mg tablet Commonly known as:  LIPITOR Your last dose was: Your next dose is: Take 40 mg by mouth nightly. 40 mg  
    
   
   
   
  
 ELIQUIS 5 mg tablet Generic drug:  apixaban Your last dose was: Your next dose is: Take 5 mg by mouth two (2) times a day. 5 mg  
    
   
   
   
  
 ezetimibe 10 mg tablet Commonly known as:  Belem Ahumada Your last dose was: Your next dose is: Take 10 mg by mouth nightly. 10 mg  
    
   
   
   
  
 losartan 25 mg tablet Commonly known as:  COZAAR Your last dose was: Your next dose is:    
   
   
 25 mg.  
 25 mg  
    
   
   
   
  
 oxyCODONE-acetaminophen 5-325 mg per tablet Commonly known as:  PERCOCET  
   
 To Dr. Childress, please advise  Last seen by you 10/29/20   Your last dose was: Your next dose is: Take 1 Tab by mouth every four (4) hours as needed for Pain. Max Daily Amount: 6 Tabs. 1 Tab  
    
   
   
   
  
 sotalol 80 mg tablet Commonly known as:  Magda Madsen Your last dose was: Your next dose is: Take  by Mouth. Where to Get Your Medications Information on where to get these meds will be given to you by the nurse or doctor. ! Ask your nurse or doctor about these medications  
  oxyCODONE-acetaminophen 5-325 mg per tablet Discharge Instructions Instructions Following Ambulatory Surgery Patient: Jami Montenegro MRN: [id number]  SSN: [id number] YOB: 1945  Age: 67 y.o. Sex: female Activity · As tolerated, walking encourage, stairs are okay · Avoid strenuous activities - no lifting anything heavier than 15 pounds. · You may shower at home after 48 hours. Diet · Regular diet after nausea from the anesthetic has passed. Pain · Take pain medication as directed by your doctor ·  Call your doctor if pain is NOT relieved by medication Dressing Care · Remove outer dressing (if any) after 48 hours. Leave steri-strips (if any) in place until they fall off. After Anesthesia · For the first 24 hours: DO NOT Drive, Drink alcoholic beverages, or Make important decisions · Be aware of dizziness following anesthesia and while taking pain medication Call your doctor if 
· Excessive bleeding that does not stop after holding mild pressure over the area · Temperature of 101 degrees F or above · Redness,excessive swelling or bruising, and/or green or yellow, smelly discharge from incision · If nausea and vomiting continues Follow-Up Phone Calls · Call the office at [phone] to make your follow-up appointment Appointment date/time: 2 weeks after the surgery. Dr. Julianne Brady cell phone number is [phone]. Please call me if you have any concerns or questions. Patient armband removed and given to patient to take home. Patient was informed of the privacy risks if armband lost or stolen Introducing [facility]! Carmen Erendira introduces [employer] patient portal. Now you can access parts of your medical record, email your doctor's office, and request medication refills online. 1. In your internet browser, go to [email or web address] People 2. Click on the First Time User? Click Here link in the Sign In box. You will see the New Member Sign Up page. 3. Enter your Bluespec Access Code exactly as it appears below. You will not need to use this code after youve completed the sign-up process. If you do not sign up before the expiration date, you must request a new code. · Bluespec Access Code: [email or web address] Expires: 2/13/2018  9:45 AM 
 
4. Enter the last four digits of your Social Security Number (xxxx) and Date of Birth (mm/dd/yyyy) as indicated and click Submit. You will be taken to the next sign-up page. 5. Create a Bluespec ID. This will be your Bluespec login ID and cannot be changed, so think of one that is secure and easy to remember. 6. Create a Bluespec password. You can change your password at any time. 7. Enter your Password Reset Question and Answer. This can be used at a later time if you forget your password. 8. Enter your e-mail address. You will receive e-mail notification when new information is available in [address] 9. Click Sign Up. You can now view and download portions of your medical record. 10. Click the Download Summary menu link to download a portable copy of your medical information. If you have questions, please visit the Frequently Asked Questions section of the [employer] website. Remember, [employer] is NOT to be used for urgent needs. For medical emergencies, dial 911. Now available from your iPhone and Android! Unresulted Labs-Please follow up with your PCP about these lab tests Order Current Status EKG, 12 LEAD, INITIAL Preliminary result Providers Seen During Your Hospitalization Provider Specialty Primary office phone Aixa Oh MD Surgery [phone] Your Primary Care Physician (PCP) Primary Care Physician Office Phone Office Fax Danica Morley [phone] You are allergic to the following Allergen Reactions Sulfa (Sulfonamide Antibiotics) Rash Recent Documentation Height Weight BMI OB Status Smoking Status 1.651 m 53.2 kg 19.52 kg/m2 Postmenopausal Former Smoker Emergency Contacts Name Discharge Info Relation Home Work Mobile Jesus Romeo DISCHARGE CAREGIVER [3] Spouse [3] [phone] Patient Belongings The following personal items are in your possession at time of discharge: 
  Dental Appliances: None  Visual Aid: Glasses      Home Medications: None   Jewelry: None  Clothing: Pants, Shirt, Footwear, Undergarments, Sweater, Socks    Other Valuables: Eyeglasses (book) Please provide this summary of care documentation to your next provider. Signatures-by signing, you are acknowledging that this After Visit Summary has been reviewed with you and you have received a copy. Patient Signature:  ____________________________________________________________ Date:  ____________________________________________________________  
  
Gricelda Escobar Provider Signature:  ____________________________________________________________ Date:  ____________________________________________________________

## 2023-05-26 RX ORDER — ASPIRIN 81 MG/1
81 TABLET ORAL
COMMUNITY

## 2023-05-26 RX ORDER — LOSARTAN POTASSIUM 25 MG/1
25 TABLET ORAL
COMMUNITY

## 2023-05-26 RX ORDER — ATORVASTATIN CALCIUM 40 MG/1
40 TABLET, FILM COATED ORAL
COMMUNITY

## 2023-05-26 RX ORDER — OXYCODONE HYDROCHLORIDE AND ACETAMINOPHEN 5; 325 MG/1; MG/1
1 TABLET ORAL EVERY 4 HOURS PRN
COMMUNITY
Start: 2017-11-16

## 2023-05-26 RX ORDER — SOTALOL HYDROCHLORIDE 80 MG/1
TABLET ORAL
COMMUNITY

## 2023-05-26 RX ORDER — EZETIMIBE 10 MG/1
10 TABLET ORAL
COMMUNITY

## (undated) DEVICE — SUTURE VLOC 90 2/0 VL 6 GS-22 VLOCM2105

## (undated) DEVICE — STERILE POLYISOPRENE POWDER-FREE SURGICAL GLOVES: Brand: PROTEXIS

## (undated) DEVICE — ANTI-FOG SOLUTION WITH FOAM PAD: Brand: DEVON

## (undated) DEVICE — Device

## (undated) DEVICE — COLUMN DRAPE

## (undated) DEVICE — SUTURE VCRL SZ 2-0 L27IN ABSRB UD L26MM SH 1/2 CIR J417H

## (undated) DEVICE — LIGHT HANDLE: Brand: DEVON

## (undated) DEVICE — ARM DRAPE

## (undated) DEVICE — INTENDED FOR TISSUE SEPARATION, AND OTHER PROCEDURES THAT REQUIRE A SHARP SURGICAL BLADE TO PUNCTURE OR CUT.: Brand: BARD-PARKER ® CARBON RIB-BACK BLADES

## (undated) DEVICE — SEAL UNIV 5-8MM DISP BX/10 -- DA VINCI XI - SNGL USE

## (undated) DEVICE — KENDALL SCD EXPRESS SLEEVES, KNEE LENGTH, MEDIUM: Brand: KENDALL SCD

## (undated) DEVICE — SOL IRR NACL 0.9% 500ML POUR --

## (undated) DEVICE — BLADELESS OBTURATOR

## (undated) DEVICE — DERMABOND SKIN ADH 0.7ML -- DERMABOND ADVANCED 12/BX

## (undated) DEVICE — SHEET,DRAPE,70X100,STERILE: Brand: MEDLINE

## (undated) DEVICE — REM POLYHESIVE ADULT PATIENT RETURN ELECTRODE: Brand: VALLEYLAB

## (undated) DEVICE — SUTURE MCRYL SZ 4-0 L18IN ABSRB UD L19MM PS-2 3/8 CIR PRIM Y496G